# Patient Record
Sex: FEMALE | Race: BLACK OR AFRICAN AMERICAN | URBAN - METROPOLITAN AREA
[De-identification: names, ages, dates, MRNs, and addresses within clinical notes are randomized per-mention and may not be internally consistent; named-entity substitution may affect disease eponyms.]

---

## 2020-07-22 ENCOUNTER — LAB OUTSIDE AN ENCOUNTER (OUTPATIENT)
Dept: URBAN - METROPOLITAN AREA CLINIC 105 | Facility: CLINIC | Age: 55
End: 2020-07-22

## 2020-07-22 ENCOUNTER — OFFICE VISIT (OUTPATIENT)
Dept: URBAN - METROPOLITAN AREA CLINIC 105 | Facility: CLINIC | Age: 55
End: 2020-07-22
Payer: COMMERCIAL

## 2020-07-22 DIAGNOSIS — R11.2 NAUSEA & VOMITING: ICD-10-CM

## 2020-07-22 DIAGNOSIS — K62.5 RECTAL BLEEDING: ICD-10-CM

## 2020-07-22 DIAGNOSIS — F64.0 TRANSGENDER: ICD-10-CM

## 2020-07-22 DIAGNOSIS — R19.5 GUAIAC + STOOL: ICD-10-CM

## 2020-07-22 DIAGNOSIS — D50.0 NORMOCYTIC ANEMIA DUE TO BLOOD LOSS: ICD-10-CM

## 2020-07-22 PROCEDURE — 82746 ASSAY OF FOLIC ACID SERUM: CPT | Performed by: INTERNAL MEDICINE

## 2020-07-22 PROCEDURE — 82607 VITAMIN B-12: CPT | Performed by: INTERNAL MEDICINE

## 2020-07-22 PROCEDURE — G8427 DOCREV CUR MEDS BY ELIG CLIN: HCPCS | Performed by: INTERNAL MEDICINE

## 2020-07-22 PROCEDURE — 3017F COLORECTAL CA SCREEN DOC REV: CPT | Performed by: INTERNAL MEDICINE

## 2020-07-22 PROCEDURE — 99204 OFFICE O/P NEW MOD 45 MIN: CPT | Performed by: INTERNAL MEDICINE

## 2020-07-22 PROCEDURE — G9903 PT SCRN TBCO ID AS NON USER: HCPCS | Performed by: INTERNAL MEDICINE

## 2020-07-22 PROCEDURE — G8417 CALC BMI ABV UP PARAM F/U: HCPCS | Performed by: INTERNAL MEDICINE

## 2020-07-22 RX ORDER — OMEPRAZOLE 40 MG/1
1 CAPSULE 30 MINUTES BEFORE MORNING MEAL CAPSULE, DELAYED RELEASE ORAL TWICE A DAY
Status: ACTIVE | COMMUNITY

## 2020-07-22 RX ORDER — ATORVASTATIN CALCIUM 20 MG/1
1 TABLET TABLET, FILM COATED ORAL ONCE A DAY
Status: ACTIVE | COMMUNITY

## 2020-07-22 RX ORDER — CYANOCOBALAMIN (VITAMIN B-12) 500 MCG
1 TABLET TABLET ORAL ONCE A DAY
Status: ACTIVE | COMMUNITY

## 2020-07-22 RX ORDER — IBUPROFEN SODIUM 256 MG/1
1 TABLET WITH FOOD OR MILK AS NEEDED TABLET, COATED ORAL THREE TIMES A DAY
Status: ACTIVE | COMMUNITY

## 2020-07-22 NOTE — HPI-OTHER HISTORIES
The patient presents on hospital follow-up for anemia due to a GI bleed.  Today, she says she went to the ER ([facility]) last Monday due to dehydration after an acute episode of nausea/vomiting. She was kept overnight and given fluids and blood. She says she was given 2 pints of blood. She denies hematemesis. She reported darker stools with the acute episode of nausea/vomiting. She was put on omeprazole 40 mg BID for 14 days. Nausea/vomiting and abdominal pain resolved. She denies a history of vitamin B12 deficiency. She is not on oral iron. There have been no prior surgeries.  Labs 7/15/20 - CMP normal except potassium 3.3. 7/14/20 - CBC normal except WBC 12.7, hgb 9.7. BMP normal. 7/13/20 - CBC normal except WBC 11.5, hgb 7.7. BMP normal except BUN 31, alk phos 30, GFR 57, creatinine 1.23. Iron/TIBC normal. 7/12/20 - Tot chol 282, triglyceride 162, non-, , VLDL 32. Hgb A1c normal.

## 2020-07-23 LAB
BASO (ABSOLUTE): 0
BASOS: 0
EOS (ABSOLUTE): 0.3
EOS: 3
FERRITIN, SERUM: 112
FOLATE (FOLIC ACID), SERUM: 10.9
HEMATOCRIT: 24.7
HEMATOLOGY COMMENTS:: (no result)
HEMOGLOBIN: 7.7
HGB A2: 1.8
HGB A: 98.2
HGB C: 0
HGB F: 0
HGB S: 0
HGB SOLUBILITY: NEGATIVE
HGB VARIANT: (no result)
IMMATURE CELLS: (no result)
IMMATURE GRANS (ABS): 0
IMMATURE GRANULOCYTES: 1
INTERPRETATION: (no result)
IRON BIND.CAP.(TIBC): 324
IRON SATURATION: 9
IRON: 28
LYMPHS (ABSOLUTE): 3.1
LYMPHS: 39
MCH: 29.2
MCHC: 31.2
MCV: 94
MONOCYTES(ABSOLUTE): 0.5
MONOCYTES: 6
NEUTROPHILS (ABSOLUTE): 3.9
NEUTROPHILS: 51
NRBC: 1
PLATELETS: 488
RBC: 2.64
RDW: 14
UIBC: 296
VITAMIN B12: 525
WBC: 7.8

## 2020-07-24 ENCOUNTER — TELEPHONE ENCOUNTER (OUTPATIENT)
Dept: URBAN - METROPOLITAN AREA CLINIC 92 | Facility: CLINIC | Age: 55
End: 2020-07-24

## 2020-07-24 RX ORDER — FERROUS GLUCONATE 324 MG
1 TABLET WITH WATER OR JUICE BETWEEN MEALS TABLET ORAL ONCE A DAY
Qty: 90 TABLET | Refills: 1 | OUTPATIENT
Start: 2020-07-24

## 2020-09-01 ENCOUNTER — TELEPHONE ENCOUNTER (OUTPATIENT)
Dept: URBAN - METROPOLITAN AREA CLINIC 92 | Facility: CLINIC | Age: 55
End: 2020-09-01

## 2020-09-01 ENCOUNTER — OFFICE VISIT (OUTPATIENT)
Dept: URBAN - METROPOLITAN AREA MEDICAL CENTER 33 | Facility: MEDICAL CENTER | Age: 55
End: 2020-09-01
Payer: COMMERCIAL

## 2020-09-01 DIAGNOSIS — B96.81 BACTERIAL INFECTION DUE TO H. PYLORI: ICD-10-CM

## 2020-09-01 DIAGNOSIS — K29.60 ADENOPAPILLOMATOSIS GASTRICA: ICD-10-CM

## 2020-09-01 DIAGNOSIS — K62.5 ANAL BLEEDING: ICD-10-CM

## 2020-09-01 PROCEDURE — G9937 DIG OR SURV COLSCO: HCPCS | Performed by: INTERNAL MEDICINE

## 2020-09-01 PROCEDURE — 45378 DIAGNOSTIC COLONOSCOPY: CPT | Performed by: INTERNAL MEDICINE

## 2020-09-01 PROCEDURE — 43239 EGD BIOPSY SINGLE/MULTIPLE: CPT | Performed by: INTERNAL MEDICINE

## 2020-09-01 RX ORDER — FERROUS GLUCONATE 324 MG
1 TABLET WITH WATER OR JUICE BETWEEN MEALS TABLET ORAL ONCE A DAY
Qty: 90 TABLET | Refills: 1 | Status: ACTIVE | COMMUNITY
Start: 2020-07-24

## 2020-09-01 RX ORDER — IBUPROFEN SODIUM 256 MG/1
1 TABLET WITH FOOD OR MILK AS NEEDED TABLET, COATED ORAL THREE TIMES A DAY
Status: ACTIVE | COMMUNITY

## 2020-09-01 RX ORDER — OMEPRAZOLE 40 MG/1
1 CAPSULE 30 MINUTES BEFORE MORNING MEAL CAPSULE, DELAYED RELEASE ORAL ONCE A DAY
Qty: 90 | Refills: 1

## 2020-09-01 RX ORDER — CYANOCOBALAMIN (VITAMIN B-12) 500 MCG
1 TABLET TABLET ORAL ONCE A DAY
Status: ACTIVE | COMMUNITY

## 2020-09-01 RX ORDER — ATORVASTATIN CALCIUM 20 MG/1
1 TABLET TABLET, FILM COATED ORAL ONCE A DAY
Status: ACTIVE | COMMUNITY

## 2020-09-01 RX ORDER — OMEPRAZOLE 40 MG/1
1 CAPSULE 30 MINUTES BEFORE MORNING MEAL CAPSULE, DELAYED RELEASE ORAL TWICE A DAY
Status: ACTIVE | COMMUNITY

## 2020-10-27 ENCOUNTER — OFFICE VISIT (OUTPATIENT)
Dept: URBAN - METROPOLITAN AREA CLINIC 105 | Facility: CLINIC | Age: 55
End: 2020-10-27
Payer: COMMERCIAL

## 2020-10-27 DIAGNOSIS — K64.8 INTERNAL HEMORRHOID, BLEEDING: ICD-10-CM

## 2020-10-27 DIAGNOSIS — D50.9 IRON DEFICIENCY ANEMIA: ICD-10-CM

## 2020-10-27 DIAGNOSIS — A04.8 HELICOBACTER PYLORI (H. PYLORI): ICD-10-CM

## 2020-10-27 DIAGNOSIS — R11.2 NAUSEA & VOMITING: ICD-10-CM

## 2020-10-27 PROCEDURE — G8427 DOCREV CUR MEDS BY ELIG CLIN: HCPCS | Performed by: INTERNAL MEDICINE

## 2020-10-27 PROCEDURE — G9903 PT SCRN TBCO ID AS NON USER: HCPCS | Performed by: INTERNAL MEDICINE

## 2020-10-27 PROCEDURE — G8482 FLU IMMUNIZE ORDER/ADMIN: HCPCS | Performed by: INTERNAL MEDICINE

## 2020-10-27 PROCEDURE — 99214 OFFICE O/P EST MOD 30 MIN: CPT | Performed by: INTERNAL MEDICINE

## 2020-10-27 PROCEDURE — 3017F COLORECTAL CA SCREEN DOC REV: CPT | Performed by: INTERNAL MEDICINE

## 2020-10-27 PROCEDURE — G8417 CALC BMI ABV UP PARAM F/U: HCPCS | Performed by: INTERNAL MEDICINE

## 2020-10-27 PROCEDURE — 1036F TOBACCO NON-USER: CPT | Performed by: INTERNAL MEDICINE

## 2020-10-27 RX ORDER — IBUPROFEN SODIUM 256 MG/1
1 TABLET WITH FOOD OR MILK AS NEEDED TABLET, COATED ORAL THREE TIMES A DAY
Status: ACTIVE | COMMUNITY

## 2020-10-27 RX ORDER — ATORVASTATIN CALCIUM 20 MG/1
1 TABLET TABLET, FILM COATED ORAL ONCE A DAY
Status: ACTIVE | COMMUNITY

## 2020-10-27 RX ORDER — OMEPRAZOLE 40 MG/1
1 CAPSULE 30 MINUTES BEFORE MORNING MEAL CAPSULE, DELAYED RELEASE ORAL ONCE A DAY
Qty: 90 | Refills: 1 | Status: ACTIVE | COMMUNITY

## 2020-10-27 RX ORDER — CYANOCOBALAMIN (VITAMIN B-12) 500 MCG
1 TABLET TABLET ORAL ONCE A DAY
Status: ACTIVE | COMMUNITY

## 2020-10-27 RX ORDER — FERROUS GLUCONATE 324 MG
1 TABLET WITH WATER OR JUICE BETWEEN MEALS TABLET ORAL ONCE A DAY
Qty: 90 TABLET | Refills: 1 | Status: ACTIVE | COMMUNITY
Start: 2020-07-24

## 2020-10-27 NOTE — HPI-OTHER HISTORIES
The patient presents on hospital follow-up for anemia due to a GI bleed.  On 7/22/20, she said she went to the ER ([facility]) last Monday due to dehydration after an acute episode of nausea/vomiting. She was kept overnight and given fluids and blood. She said she was given 2 pints of blood. She denied hematemesis. She reported darker stools with the acute episode of nausea/vomiting. She was put on omeprazole 40 mg BID for 14 days. Nausea/vomiting and abdominal pain resolved. She denied a history of vitamin B12 deficiency. She was not on oral iron. There had been no prior surgeries.  Today, she says she didn't take omeprazole 40 mg BID with abx treatment for H. pylori. She says she was told to hold on omeprazole during that time - MA clearly documented instructed on taking PPI BID and with BID script sent to pharmacy. She is currently on omeprazole 40 mg QD. She is now on iron 1 pill QD. She notes increased energy since starting on iron. Nausea/vomiting has resolved. Rectal bleeding is associated with constipation. She takes a stool softener PRN.  Labs 7/22/20 - CBC normal except hgb 7.7, platelets 488. Iron sat 9%. Vit B12/folate, ferritin, hgb frac all normal. 7/15/20 - CMP normal except potassium 3.3. 7/14/20 - CBC normal except WBC 12.7, hgb 9.7. BMP normal. 7/13/20 - CBC normal except WBC 11.5, hgb 7.7. BMP normal except BUN 31, alk phos 30, GFR 57, creatinine 1.23. Iron/TIBC normal. 7/12/20 - Tot chol 282, triglyceride 162, non-, , VLDL 32. Hgb A1c normal.

## 2020-10-29 LAB
BASO (ABSOLUTE): 0.1
BASOS: 1
EOS (ABSOLUTE): 0.2
EOS: 2
FERRITIN, SERUM: 145
H PYLORI BREATH TEST: NEGATIVE
HEMATOCRIT: 34.9
HEMATOLOGY COMMENTS:: (no result)
HEMOGLOBIN: 11.3
IMMATURE CELLS: (no result)
IMMATURE GRANS (ABS): 0
IMMATURE GRANULOCYTES: 0
IRON BIND.CAP.(TIBC): 356
IRON SATURATION: 21
IRON: 74
LYMPHS (ABSOLUTE): 2.8
LYMPHS: 40
MCH: 28.5
MCHC: 32.4
MCV: 88
MONOCYTES(ABSOLUTE): 0.5
MONOCYTES: 7
NEUTROPHILS (ABSOLUTE): 3.4
NEUTROPHILS: 50
NRBC: (no result)
PLATELETS: 360
RBC: 3.97
RDW: 13.9
UIBC: 282
WBC: 6.9

## 2021-01-05 ENCOUNTER — OFFICE VISIT (OUTPATIENT)
Dept: URBAN - METROPOLITAN AREA CLINIC 105 | Facility: CLINIC | Age: 56
End: 2021-01-05
Payer: COMMERCIAL

## 2021-01-05 VITALS
BODY MASS INDEX: 33.73 KG/M2 | TEMPERATURE: 97 F | DIASTOLIC BLOOD PRESSURE: 83 MMHG | SYSTOLIC BLOOD PRESSURE: 123 MMHG | HEART RATE: 70 BPM | HEIGHT: 70 IN | WEIGHT: 235.6 LBS

## 2021-01-05 DIAGNOSIS — E53.8 VITAMIN B12 DEFICIENCY: ICD-10-CM

## 2021-01-05 DIAGNOSIS — K64.8 INTERNAL HEMORRHOID, BLEEDING: ICD-10-CM

## 2021-01-05 DIAGNOSIS — D50.9 IRON DEFICIENCY ANEMIA: ICD-10-CM

## 2021-01-05 DIAGNOSIS — Z12.11 SCREEN FOR COLON CANCER: ICD-10-CM

## 2021-01-05 DIAGNOSIS — E55.9 VITAMIN D DEFICIENCY: ICD-10-CM

## 2021-01-05 DIAGNOSIS — A04.8 HELICOBACTER PYLORI (H. PYLORI): ICD-10-CM

## 2021-01-05 DIAGNOSIS — R11.2 NAUSEA & VOMITING: ICD-10-CM

## 2021-01-05 DIAGNOSIS — F64.0 TRANSGENDER: ICD-10-CM

## 2021-01-05 PROCEDURE — G9903 PT SCRN TBCO ID AS NON USER: HCPCS | Performed by: INTERNAL MEDICINE

## 2021-01-05 PROCEDURE — 3017F COLORECTAL CA SCREEN DOC REV: CPT | Performed by: INTERNAL MEDICINE

## 2021-01-05 PROCEDURE — G8417 CALC BMI ABV UP PARAM F/U: HCPCS | Performed by: INTERNAL MEDICINE

## 2021-01-05 PROCEDURE — G8482 FLU IMMUNIZE ORDER/ADMIN: HCPCS | Performed by: INTERNAL MEDICINE

## 2021-01-05 PROCEDURE — G8427 DOCREV CUR MEDS BY ELIG CLIN: HCPCS | Performed by: INTERNAL MEDICINE

## 2021-01-05 PROCEDURE — 99214 OFFICE O/P EST MOD 30 MIN: CPT | Performed by: INTERNAL MEDICINE

## 2021-01-05 RX ORDER — OMEPRAZOLE 40 MG/1
1 CAPSULE 30 MINUTES BEFORE MORNING MEAL CAPSULE, DELAYED RELEASE ORAL ONCE A DAY
Qty: 90 | Refills: 1 | Status: ACTIVE | COMMUNITY

## 2021-01-05 RX ORDER — CYANOCOBALAMIN (VITAMIN B-12) 500 MCG
1 TABLET TABLET ORAL ONCE A DAY
Status: ACTIVE | COMMUNITY

## 2021-01-05 RX ORDER — FERROUS GLUCONATE 324 MG
1 TABLET WITH WATER OR JUICE BETWEEN MEALS TABLET ORAL ONCE A DAY
Qty: 90 TABLET | Refills: 1 | Status: ACTIVE | COMMUNITY
Start: 2020-07-24

## 2021-01-05 RX ORDER — ATORVASTATIN CALCIUM 20 MG/1
1 TABLET TABLET, FILM COATED ORAL ONCE A DAY
Status: ACTIVE | COMMUNITY

## 2021-01-05 RX ORDER — IBUPROFEN SODIUM 256 MG/1
1 TABLET WITH FOOD OR MILK AS NEEDED TABLET, COATED ORAL THREE TIMES A DAY
Status: ACTIVE | COMMUNITY

## 2021-01-05 NOTE — HPI-OTHER HISTORIES
The patient presents on hospital follow-up for anemia due to a GI bleed.  On 7/22/20, she said she went to the ER ([facility]) last Monday due to dehydration after an acute episode of nausea/vomiting. She was kept overnight and given fluids and blood. She said she was given 2 pints of blood. She denied hematemesis. She reported darker stools with the acute episode of nausea/vomiting. She was put on omeprazole 40 mg BID for 14 days. Nausea/vomiting and abdominal pain resolved. She denied a history of vitamin B12 deficiency. She was not on oral iron. There had been no prior surgeries.  On 10/27/20, she said she didn't take omeprazole 40 mg BID with abx treatment for H. pylori. She said she was told to hold on omeprazole during that time - MA clearly documented instructed on taking PPI BID and with BID script sent to pharmacy. She was currently on omeprazole 40 mg QD. She was now on iron 1 pill QD. She noted increased energy since starting on iron. Nausea/vomiting has resolved. Rectal bleeding was associated with constipation. She took a stool softener PRN.  Today, she says she is off iron. She continues on vitamin B12/D/C and omeprazole 40 mg QAM. There hasn't been a recurrence of nausea/vomiting she states. She feels energized.  Labs 10/27/20 - H. pylori breath test negative. CBC, CMP, iron/TIBC, ferritin all normal. 7/22/20 - CBC normal except hgb 7.7, platelets 488. Iron sat 9%. Vit B12/folate, ferritin, hgb frac all normal. 7/15/20 - CMP normal except potassium 3.3. 7/14/20 - CBC normal except WBC 12.7, hgb 9.7. BMP normal. 7/13/20 - CBC normal except WBC 11.5, hgb 7.7. BMP normal except BUN 31, alk phos 30, GFR 57, creatinine 1.23. Iron/TIBC normal. 7/12/20 - Tot chol 282, triglyceride 162, non-, , VLDL 32. Hgb A1c normal.

## 2021-01-06 LAB
BASO (ABSOLUTE): 0
BASOS: 1
EOS (ABSOLUTE): 0.3
EOS: 4
FERRITIN, SERUM: 100
HEMATOCRIT: 37.3
HEMATOLOGY COMMENTS:: (no result)
HEMOGLOBIN: 11.9
IMMATURE CELLS: (no result)
IMMATURE GRANS (ABS): 0
IMMATURE GRANULOCYTES: 0
IRON BIND.CAP.(TIBC): 355
IRON SATURATION: 15
IRON: 53
LYMPHS (ABSOLUTE): 3
LYMPHS: 46
MCH: 29.1
MCHC: 31.9
MCV: 91
MONOCYTES(ABSOLUTE): 0.4
MONOCYTES: 6
NEUTROPHILS (ABSOLUTE): 2.8
NEUTROPHILS: 43
NRBC: (no result)
PLATELETS: 392
RBC: 4.09
RDW: 11.9
UIBC: 302
VITAMIN B12: 1178
VITAMIN D, 25-HYDROXY: 33.3
WBC: 6.5

## 2021-04-06 ENCOUNTER — WEB ENCOUNTER (OUTPATIENT)
Dept: URBAN - METROPOLITAN AREA CLINIC 105 | Facility: CLINIC | Age: 56
End: 2021-04-06

## 2021-04-06 ENCOUNTER — OFFICE VISIT (OUTPATIENT)
Dept: URBAN - METROPOLITAN AREA CLINIC 105 | Facility: CLINIC | Age: 56
End: 2021-04-06
Payer: COMMERCIAL

## 2021-04-06 DIAGNOSIS — Z12.11 SCREEN FOR COLON CANCER: ICD-10-CM

## 2021-04-06 DIAGNOSIS — E53.8 VITAMIN B12 DEFICIENCY: ICD-10-CM

## 2021-04-06 DIAGNOSIS — K64.8 INTERNAL HEMORRHOID, BLEEDING: ICD-10-CM

## 2021-04-06 DIAGNOSIS — R11.2 NAUSEA & VOMITING: ICD-10-CM

## 2021-04-06 DIAGNOSIS — A04.8 HELICOBACTER PYLORI (H. PYLORI): ICD-10-CM

## 2021-04-06 DIAGNOSIS — E55.9 VITAMIN D DEFICIENCY: ICD-10-CM

## 2021-04-06 DIAGNOSIS — D50.9 IRON DEFICIENCY ANEMIA: ICD-10-CM

## 2021-04-06 DIAGNOSIS — F64.0 TRANSGENDER: ICD-10-CM

## 2021-04-06 PROCEDURE — 99214 OFFICE O/P EST MOD 30 MIN: CPT | Performed by: INTERNAL MEDICINE

## 2021-04-06 RX ORDER — IBUPROFEN SODIUM 256 MG/1
1 TABLET WITH FOOD OR MILK AS NEEDED TABLET, COATED ORAL THREE TIMES A DAY
Status: ACTIVE | COMMUNITY

## 2021-04-06 RX ORDER — ATORVASTATIN CALCIUM 20 MG/1
1 TABLET TABLET, FILM COATED ORAL ONCE A DAY
Status: ACTIVE | COMMUNITY

## 2021-04-06 RX ORDER — CYANOCOBALAMIN (VITAMIN B-12) 500 MCG
1 TABLET TABLET ORAL ONCE A DAY
Status: ACTIVE | COMMUNITY

## 2021-04-06 RX ORDER — OMEPRAZOLE 40 MG/1
1 CAPSULE 30 MINUTES BEFORE MORNING MEAL CAPSULE, DELAYED RELEASE ORAL ONCE A DAY
Qty: 90 | Refills: 1 | Status: ACTIVE | COMMUNITY

## 2021-04-06 RX ORDER — FERROUS GLUCONATE 324 MG
1 TABLET WITH WATER OR JUICE BETWEEN MEALS TABLET ORAL ONCE A DAY
Qty: 90 TABLET | Refills: 1 | Status: ACTIVE | COMMUNITY
Start: 2020-07-24

## 2021-04-06 NOTE — HPI-OTHER HISTORIES
The patient presents on hospital follow-up for anemia due to a GI bleed.  On 7/22/20, she said she went to the ER ([facility]) last Monday due to dehydration after an acute episode of nausea/vomiting. She was kept overnight and given fluids and blood. She said she was given 2 pints of blood. She denied hematemesis. She reported darker stools with the acute episode of nausea/vomiting. She was put on omeprazole 40 mg BID for 14 days. Nausea/vomiting and abdominal pain resolved. She denied a history of vitamin B12 deficiency. She was not on oral iron. There had been no prior surgeries.  On 10/27/20, she said she didn't take omeprazole 40 mg BID with abx treatment for H. pylori. She said she was told to hold on omeprazole during that time - MA clearly documented instructed on taking PPI BID and with BID script sent to pharmacy. She was currently on omeprazole 40 mg QD. She was now on iron 1 pill QD. She noted increased energy since starting on iron. Nausea/vomiting has resolved. Rectal bleeding was associated with constipation. She took a stool softener PRN.  On 1/5/21, she said she was off iron. She continued on vitamin B12/D/C and omeprazole 40 mg QAM. There hadn't been a recurrence of nausea/vomiting she stated. She felt energized.  Today, she says she tapered off omeprazole in March. She continues on vit D/B12/C.  She denies any nausea/vomiting.  Labs 1/5/21 - CBC, iron/TIBC, ferritin, vit B12 all normal. 10/27/20 - H. pylori breath test negative. CBC, CMP, iron/TIBC, ferritin all normal. 7/22/20 - CBC normal except hgb 7.7, platelets 488. Iron sat 9%. Vit B12/folate, ferritin, hgb frac all normal. 7/15/20 - CMP normal except potassium 3.3. 7/14/20 - CBC normal except WBC 12.7, hgb 9.7. BMP normal. 7/13/20 - CBC normal except WBC 11.5, hgb 7.7. BMP normal except BUN 31, alk phos 30, GFR 57, creatinine 1.23. Iron/TIBC normal. 7/12/20 - Tot chol 282, triglyceride 162, non-, , VLDL 32. Hgb A1c normal.

## 2021-07-20 ENCOUNTER — TELEPHONE ENCOUNTER (OUTPATIENT)
Dept: URBAN - METROPOLITAN AREA CLINIC 105 | Facility: CLINIC | Age: 56
End: 2021-07-20

## 2021-07-20 RX ORDER — OMEPRAZOLE 40 MG/1
1 CAPSULE 30 MINUTES BEFORE MORNING MEAL CAPSULE, DELAYED RELEASE ORAL ONCE A DAY
Qty: 90 | Refills: 3

## 2021-07-20 RX ORDER — OMEPRAZOLE 40 MG/1
1 CAPSULE 30 MINUTES BEFORE MORNING MEAL CAPSULE, DELAYED RELEASE ORAL ONCE A DAY
Qty: 90 | Refills: 1

## 2021-10-06 ENCOUNTER — OFFICE VISIT (OUTPATIENT)
Dept: URBAN - METROPOLITAN AREA CLINIC 105 | Facility: CLINIC | Age: 56
End: 2021-10-06
Payer: COMMERCIAL

## 2021-10-06 DIAGNOSIS — K64.8 INTERNAL HEMORRHOID, BLEEDING: ICD-10-CM

## 2021-10-06 DIAGNOSIS — R11.2 NAUSEA & VOMITING: ICD-10-CM

## 2021-10-06 DIAGNOSIS — E55.9 VITAMIN D DEFICIENCY: ICD-10-CM

## 2021-10-06 DIAGNOSIS — A04.8 HELICOBACTER PYLORI (H. PYLORI): ICD-10-CM

## 2021-10-06 DIAGNOSIS — E53.8 VITAMIN B12 DEFICIENCY: ICD-10-CM

## 2021-10-06 DIAGNOSIS — Z12.11 SCREEN FOR COLON CANCER: ICD-10-CM

## 2021-10-06 DIAGNOSIS — D50.9 IRON DEFICIENCY ANEMIA: ICD-10-CM

## 2021-10-06 DIAGNOSIS — F64.0 TRANSGENDER: ICD-10-CM

## 2021-10-06 PROCEDURE — 99214 OFFICE O/P EST MOD 30 MIN: CPT | Performed by: INTERNAL MEDICINE

## 2021-10-06 RX ORDER — IBUPROFEN SODIUM 256 MG/1
1 TABLET WITH FOOD OR MILK AS NEEDED TABLET, COATED ORAL THREE TIMES A DAY
Status: ON HOLD | COMMUNITY

## 2021-10-06 RX ORDER — CYANOCOBALAMIN (VITAMIN B-12) 500 MCG
1 TABLET TABLET ORAL ONCE A DAY
Status: ACTIVE | COMMUNITY

## 2021-10-06 RX ORDER — ATORVASTATIN CALCIUM 20 MG/1
1 TABLET TABLET, FILM COATED ORAL ONCE A DAY
Status: ACTIVE | COMMUNITY

## 2021-10-06 RX ORDER — FERROUS GLUCONATE 324 MG
1 TABLET WITH WATER OR JUICE BETWEEN MEALS TABLET ORAL ONCE A DAY
Qty: 90 TABLET | Refills: 1 | Status: ACTIVE | COMMUNITY
Start: 2020-07-24

## 2021-10-06 RX ORDER — OMEPRAZOLE 40 MG/1
1 CAPSULE 30 MINUTES BEFORE MORNING MEAL CAPSULE, DELAYED RELEASE ORAL ONCE A DAY
Qty: 90 | Refills: 1 | Status: ACTIVE | COMMUNITY

## 2021-10-06 NOTE — HPI-OTHER HISTORIES
The patient presents on hospital follow-up for anemia due to a GI bleed.  On 7/22/20, she said she went to the ER ([facility]) last Monday due to dehydration after an acute episode of nausea/vomiting. She was kept overnight and given fluids and blood. She said she was given 2 pints of blood. She denied hematemesis. She reported darker stools with the acute episode of nausea/vomiting. She was put on omeprazole 40 mg BID for 14 days. Nausea/vomiting and abdominal pain resolved. She denied a history of vitamin B12 deficiency. She was not on oral iron. There had been no prior surgeries.  On 10/27/20, she said she didn't take omeprazole 40 mg BID with abx treatment for H. pylori. She said she was told to hold on omeprazole during that time - MA clearly documented instructed on taking PPI BID and with BID script sent to pharmacy. She was currently on omeprazole 40 mg QD. She was now on iron 1 pill QD. She noted increased energy since starting on iron. Nausea/vomiting has resolved. Rectal bleeding was associated with constipation. She took a stool softener PRN.  On 1/5/21, she said she was off iron. She continued on vitamin B12/D/C and omeprazole 40 mg QAM. There hadn't been a recurrence of nausea/vomiting she stated. She felt energized.  On 4/6/21, she said she tapered off omeprazole in March. She continued on vit D/B12/C.  She denied any nausea/vomiting.  Today, she says she did not get around to getting her labs done with [facility]. She had her annual physical at the beginning of the Summer. She has not had a recurrence of nausea/vomiting. She takes omeprazole PRN.  Labs 1/5/21 - CBC, iron/TIBC, ferritin, vit B12 all normal. 10/27/20 - H. pylori breath test negative. CBC, CMP, iron/TIBC, ferritin all normal. 7/22/20 - CBC normal except hgb 7.7, platelets 488. Iron sat 9%. Vit B12/folate, ferritin, hgb frac all normal. 7/15/20 - CMP normal except potassium 3.3. 7/14/20 - CBC normal except WBC 12.7, hgb 9.7. BMP normal. 7/13/20 - CBC normal except WBC 11.5, hgb 7.7. BMP normal except BUN 31, alk phos 30, GFR 57, creatinine 1.23. Iron/TIBC normal. 7/12/20 - Tot chol 282, triglyceride 162, non-, , VLDL 32. Hgb A1c normal.

## 2021-10-08 LAB
BASO (ABSOLUTE): 0
BASOS: 1
EOS (ABSOLUTE): 0.2
EOS: 3
FERRITIN, SERUM: 228
H PYLORI BREATH TEST: NEGATIVE
HEMATOCRIT: 40.3
HEMATOLOGY COMMENTS:: (no result)
HEMOGLOBIN: 13
IMMATURE CELLS: (no result)
IMMATURE GRANS (ABS): 0
IMMATURE GRANULOCYTES: 0
IRON BIND.CAP.(TIBC): 356
IRON SATURATION: 24
IRON: 84
LYMPHS (ABSOLUTE): 4.5
LYMPHS: 57
MCH: 29.3
MCHC: 32.3
MCV: 91
MONOCYTES(ABSOLUTE): 0.5
MONOCYTES: 6
NEUTROPHILS (ABSOLUTE): 2.6
NEUTROPHILS: 33
NRBC: (no result)
PLATELETS: 369
RBC: 4.44
RDW: 11.7
UIBC: 272
VITAMIN B12: 1130
VITAMIN D, 25-HYDROXY: 66
WBC: 7.9

## 2022-04-06 ENCOUNTER — OFFICE VISIT (OUTPATIENT)
Dept: URBAN - METROPOLITAN AREA CLINIC 105 | Facility: CLINIC | Age: 57
End: 2022-04-06
Payer: COMMERCIAL

## 2022-04-06 VITALS
BODY MASS INDEX: 34.13 KG/M2 | HEART RATE: 91 BPM | DIASTOLIC BLOOD PRESSURE: 84 MMHG | TEMPERATURE: 97 F | HEIGHT: 70 IN | WEIGHT: 238.4 LBS | SYSTOLIC BLOOD PRESSURE: 137 MMHG

## 2022-04-06 DIAGNOSIS — E55.9 VITAMIN D DEFICIENCY: ICD-10-CM

## 2022-04-06 DIAGNOSIS — K64.8 INTERNAL HEMORRHOID, BLEEDING: ICD-10-CM

## 2022-04-06 DIAGNOSIS — R11.2 NAUSEA & VOMITING: ICD-10-CM

## 2022-04-06 DIAGNOSIS — E53.8 VITAMIN B12 DEFICIENCY: ICD-10-CM

## 2022-04-06 DIAGNOSIS — Z12.11 SCREEN FOR COLON CANCER: ICD-10-CM

## 2022-04-06 DIAGNOSIS — F64.0 TRANSGENDER: ICD-10-CM

## 2022-04-06 DIAGNOSIS — A04.8 HELICOBACTER PYLORI (H. PYLORI): ICD-10-CM

## 2022-04-06 DIAGNOSIS — D50.9 IRON DEFICIENCY ANEMIA: ICD-10-CM

## 2022-04-06 PROCEDURE — 99214 OFFICE O/P EST MOD 30 MIN: CPT | Performed by: INTERNAL MEDICINE

## 2022-04-06 RX ORDER — FERROUS GLUCONATE 324 MG
1 TABLET WITH WATER OR JUICE BETWEEN MEALS TABLET ORAL ONCE A DAY
Qty: 90 TABLET | Refills: 1 | Status: ACTIVE | COMMUNITY
Start: 2020-07-24

## 2022-04-06 RX ORDER — CYANOCOBALAMIN (VITAMIN B-12) 500 MCG
1 TABLET TABLET ORAL ONCE A DAY
Status: ACTIVE | COMMUNITY

## 2022-04-06 RX ORDER — OMEPRAZOLE 40 MG/1
1 CAPSULE 30 MINUTES BEFORE MORNING MEAL CAPSULE, DELAYED RELEASE ORAL ONCE A DAY
Qty: 90 | Refills: 1 | Status: ACTIVE | COMMUNITY

## 2022-04-06 RX ORDER — ATORVASTATIN CALCIUM 20 MG/1
1 TABLET TABLET, FILM COATED ORAL ONCE A DAY
Status: ACTIVE | COMMUNITY

## 2022-04-06 RX ORDER — ATORVASTATIN CALCIUM 10 MG/1
1 TABLET TABLET, FILM COATED ORAL ONCE A DAY
Status: ACTIVE | COMMUNITY

## 2022-04-06 NOTE — HPI-OTHER HISTORIES
The patient presents on hospital follow-up for anemia due to a GI bleed.  PAST MEDICAL HISTORY: On 7/22/20, she said she went to the ER ([facility]) last Monday due to dehydration after an acute episode of nausea/vomiting. She was kept overnight and given fluids and blood. She said she was given 2 pints of blood. She denied hematemesis. She reported darker stools with the acute episode of nausea/vomiting. She was put on omeprazole 40 mg BID for 14 days. Nausea/vomiting and abdominal pain resolved. She denied a history of vitamin B12 deficiency. She was not on oral iron. There had been no prior surgeries.  On 10/27/20, she said she didn't take omeprazole 40 mg BID with abx treatment for H. pylori. She said she was told to hold on omeprazole during that time - MA clearly documented instructed on taking PPI BID and with BID script sent to pharmacy. She was currently on omeprazole 40 mg QD. She was now on iron 1 pill QD. She noted increased energy since starting on iron. Nausea/vomiting has resolved. Rectal bleeding was associated with constipation. She took a stool softener PRN.  On 1/5/21, she said she was off iron. She continued on vitamin B12/D/C and omeprazole 40 mg QAM. There hadn't been a recurrence of nausea/vomiting she stated. She felt energized.  On 4/6/21, she said she tapered off omeprazole in March. She continued on vit D/B12/C.  She denied any nausea/vomiting.  On 10/6/21, she said she did not get around to getting her labs done with [facility]. She had her annual physical at the beginning of the Summer. She had not had a recurrence of nausea/vomiting. She took omeprazole PRN.  HPI: Today, she says she is asymptomatic. She continues on vit D & B12 supplementation. She is off iron.  Labs 10/7/21 - Ferritin 228, TIBC 356, UIBC 272, iron 84, iron sat 24%. H. pylori breath test negative. CBC, vit B12, vit D all normal. 1/5/21 - CBC, iron/TIBC, ferritin, vit B12 all normal. 10/27/20 - H. pylori breath test negative. CBC, CMP, iron/TIBC, ferritin all normal. 7/22/20 - CBC normal except hgb 7.7, platelets 488. Iron sat 9%. Vit B12/folate, ferritin, hgb frac all normal. 7/15/20 - CMP normal except potassium 3.3. 7/14/20 - CBC normal except WBC 12.7, hgb 9.7. BMP normal. 7/13/20 - CBC normal except WBC 11.5, hgb 7.7. BMP normal except BUN 31, alk phos 30, GFR 57, creatinine 1.23. Iron/TIBC normal. 7/12/20 - Tot chol 282, triglyceride 162, non-, , VLDL 32. Hgb A1c normal.

## 2022-04-07 LAB
BASO (ABSOLUTE): 0.1
BASOS: 1
EOS (ABSOLUTE): 0.5
EOS: 8
FERRITIN, SERUM: 177
HEMATOCRIT: 37.3
HEMATOLOGY COMMENTS:: (no result)
HEMOGLOBIN: 12.3
IMMATURE CELLS: (no result)
IMMATURE GRANS (ABS): 0
IMMATURE GRANULOCYTES: 0
IRON BIND.CAP.(TIBC): 327
IRON SATURATION: 22
IRON: 73
LYMPHS (ABSOLUTE): 3.5
LYMPHS: 56
MCH: 29.7
MCHC: 33
MCV: 90
MONOCYTES(ABSOLUTE): 0.4
MONOCYTES: 7
NEUTROPHILS (ABSOLUTE): 1.7
NEUTROPHILS: 28
NRBC: (no result)
PLATELETS: 363
RBC: 4.14
RDW: 11.9
UIBC: 254
VITAMIN B12: 691
VITAMIN D, 25-HYDROXY: 56.5
WBC: 6.2

## 2022-04-09 PROBLEM — 34713006 VITAMIN D DEFICIENCY: Status: ACTIVE | Noted: 2021-01-05

## 2022-04-09 PROBLEM — 12271241000119109 TRANSGENDER: Status: ACTIVE | Noted: 2021-04-06

## 2023-04-25 ENCOUNTER — OFFICE VISIT (OUTPATIENT)
Dept: URBAN - METROPOLITAN AREA CLINIC 105 | Facility: CLINIC | Age: 58
End: 2023-04-25

## 2023-04-25 VITALS — HEIGHT: 70 IN

## 2023-04-25 RX ORDER — ATORVASTATIN CALCIUM 10 MG/1
1 TABLET TABLET, FILM COATED ORAL ONCE A DAY
Status: ACTIVE | COMMUNITY

## 2023-04-25 RX ORDER — ATORVASTATIN CALCIUM 20 MG/1
1 TABLET TABLET, FILM COATED ORAL ONCE A DAY
Status: ACTIVE | COMMUNITY

## 2023-04-25 RX ORDER — OMEPRAZOLE 40 MG/1
1 CAPSULE 30 MINUTES BEFORE MORNING MEAL CAPSULE, DELAYED RELEASE ORAL ONCE A DAY
Qty: 90 | Refills: 1 | Status: ACTIVE | COMMUNITY

## 2023-04-25 RX ORDER — FERROUS GLUCONATE 324 MG
1 TABLET WITH WATER OR JUICE BETWEEN MEALS TABLET ORAL ONCE A DAY
Qty: 90 TABLET | Refills: 1 | Status: ACTIVE | COMMUNITY
Start: 2020-07-24

## 2023-04-25 RX ORDER — CYANOCOBALAMIN (VITAMIN B-12) 500 MCG
1 TABLET TABLET ORAL ONCE A DAY
Status: ACTIVE | COMMUNITY

## 2023-06-18 ENCOUNTER — WEB ENCOUNTER (OUTPATIENT)
Dept: URBAN - METROPOLITAN AREA CLINIC 105 | Facility: CLINIC | Age: 58
End: 2023-06-18

## 2023-06-21 ENCOUNTER — OFFICE VISIT (OUTPATIENT)
Dept: URBAN - METROPOLITAN AREA CLINIC 105 | Facility: CLINIC | Age: 58
End: 2023-06-21

## 2023-06-21 RX ORDER — ATORVASTATIN CALCIUM 10 MG/1
1 TABLET TABLET, FILM COATED ORAL ONCE A DAY
COMMUNITY

## 2023-06-21 RX ORDER — OMEPRAZOLE 40 MG/1
1 CAPSULE 30 MINUTES BEFORE MORNING MEAL CAPSULE, DELAYED RELEASE ORAL ONCE A DAY
Qty: 90 | Refills: 1 | COMMUNITY

## 2023-06-21 RX ORDER — FERROUS GLUCONATE 324 MG
1 TABLET WITH WATER OR JUICE BETWEEN MEALS TABLET ORAL ONCE A DAY
Qty: 90 TABLET | Refills: 1 | COMMUNITY
Start: 2020-07-24

## 2023-06-21 RX ORDER — ATORVASTATIN CALCIUM 20 MG/1
1 TABLET TABLET, FILM COATED ORAL ONCE A DAY
COMMUNITY

## 2023-06-21 RX ORDER — CYANOCOBALAMIN (VITAMIN B-12) 500 MCG
1 TABLET TABLET ORAL ONCE A DAY
COMMUNITY

## 2023-10-31 ENCOUNTER — TELEPHONE ENCOUNTER (OUTPATIENT)
Dept: URBAN - METROPOLITAN AREA CLINIC 50 | Facility: CLINIC | Age: 58
End: 2023-10-31

## 2023-11-07 ENCOUNTER — TELEPHONE ENCOUNTER (OUTPATIENT)
Dept: URBAN - METROPOLITAN AREA CLINIC 105 | Facility: CLINIC | Age: 58
End: 2023-11-07

## 2023-11-09 ENCOUNTER — OFFICE VISIT (OUTPATIENT)
Dept: URBAN - METROPOLITAN AREA CLINIC 105 | Facility: CLINIC | Age: 58
End: 2023-11-09
Payer: COMMERCIAL

## 2023-11-09 VITALS
BODY MASS INDEX: 34.79 KG/M2 | HEIGHT: 70 IN | TEMPERATURE: 97.2 F | HEART RATE: 91 BPM | SYSTOLIC BLOOD PRESSURE: 149 MMHG | DIASTOLIC BLOOD PRESSURE: 95 MMHG | WEIGHT: 243 LBS

## 2023-11-09 DIAGNOSIS — D50.9 ANEMIA: ICD-10-CM

## 2023-11-09 PROBLEM — 87522002: Status: ACTIVE | Noted: 2023-11-09

## 2023-11-09 PROCEDURE — 99213 OFFICE O/P EST LOW 20 MIN: CPT | Performed by: INTERNAL MEDICINE

## 2023-11-09 RX ORDER — OMEPRAZOLE 40 MG/1
1 CAPSULE 30 MINUTES BEFORE MORNING MEAL CAPSULE, DELAYED RELEASE ORAL ONCE A DAY
Qty: 90 | Refills: 1 | Status: ACTIVE | COMMUNITY

## 2023-11-09 RX ORDER — FERROUS GLUCONATE 324 MG
1 TABLET WITH WATER OR JUICE BETWEEN MEALS TABLET ORAL ONCE A DAY
Qty: 90 TABLET | Refills: 1 | Status: ON HOLD | COMMUNITY
Start: 2020-07-24

## 2023-11-09 RX ORDER — ATORVASTATIN CALCIUM 10 MG/1
1 TABLET TABLET, FILM COATED ORAL ONCE A DAY
Status: ACTIVE | COMMUNITY

## 2023-11-09 RX ORDER — FERROUS GLUCONATE 324 MG
1 TABLET WITH WATER OR JUICE BETWEEN MEALS TABLET ORAL ONCE A DAY
Qty: 90 TABLET | Refills: 0 | OUTPATIENT
Start: 2020-07-24

## 2023-11-09 RX ORDER — ATORVASTATIN CALCIUM 20 MG/1
1 TABLET TABLET, FILM COATED ORAL ONCE A DAY
Status: ACTIVE | COMMUNITY

## 2023-11-09 RX ORDER — AMLODIPINE BESYLATE 10 MG/1
TAKE ONE-HALF TABLET BY MOUTH ONCE DAILY TABLET ORAL
Qty: 15 EACH | Refills: 1 | Status: ACTIVE | COMMUNITY

## 2023-11-09 RX ORDER — CYANOCOBALAMIN (VITAMIN B-12) 500 MCG
1 TABLET TABLET ORAL ONCE A DAY
Status: ACTIVE | COMMUNITY

## 2023-11-09 NOTE — HPI-OTHER HISTORIES
The patient presents on hospital follow-up for anemia due to a GI bleed.  PAST MEDICAL HISTORY: On 7/22/20, she said she went to the ER ([facility]) last Monday due to dehydration after an acute episode of nausea/vomiting. She was kept overnight and given fluids and blood. She said she was given 2 pints of blood. She denied hematemesis. She reported darker stools with the acute episode of nausea/vomiting. She was put on omeprazole 40 mg BID for 14 days. Nausea/vomiting and abdominal pain resolved. She denied a history of vitamin B12 deficiency. She was not on oral iron. There had been no prior surgeries.  On 10/27/20, she said she didn't take omeprazole 40 mg BID with abx treatment for H. pylori. She said she was told to hold on omeprazole during that time - MA clearly documented instructed on taking PPI BID and with BID script sent to pharmacy. She was currently on omeprazole 40 mg QD. She was now on iron 1 pill QD. She noted increased energy since starting on iron. Nausea/vomiting has resolved. Rectal bleeding was associated with constipation. She took a stool softener PRN.  On 1/5/21, she said she was off iron. She continued on vitamin B12/D/C and omeprazole 40 mg QAM. There hadn't been a recurrence of nausea/vomiting she stated. She felt energized.  On 4/6/21, she said she tapered off omeprazole in March. She continued on vit D/B12/C.  She denied any nausea/vomiting.  On 10/6/21, she said she did not get around to getting her labs done with [facility]. She had her annual physical at the beginning of the Summer. She had not had a recurrence of nausea/vomiting. She took omeprazole PRN.   On 04/06/2022, she says she is asymptomatic. She continues on vit D & B12 supplementation. She is off iron.  Labs 10/7/21 - Ferritin 228, TIBC 356, UIBC 272, iron 84, iron sat 24%. H. pylori breath test negative. CBC, vit B12, vit D all normal. 1/5/21 - CBC, iron/TIBC, ferritin, vit B12 all normal. 10/27/20 - H. pylori breath test negative. CBC, CMP, iron/TIBC, ferritin all normal. 7/22/20 - CBC normal except hgb 7.7, platelets 488. Iron sat 9%. Vit B12/folate, ferritin, hgb frac all normal. 7/15/20 - CMP normal except potassium 3.3. 7/14/20 - CBC normal except WBC 12.7, hgb 9.7. BMP normal. 7/13/20 - CBC normal except WBC 11.5, hgb 7.7. BMP normal except BUN 31, alk phos 30, GFR 57, creatinine 1.23. Iron/TIBC normal. 7/12/20 - Tot chol 282, triglyceride 162, non-, , VLDL 32. Hgb A1c normal.

## 2023-11-09 NOTE — HPI-TODAY'S VISIT:
2023 Patient went PCP at [facility] for labwork on 10/27/2023. States her hgb 11.3 Patient states her iron was not checked.I reviewed the labs myself and did not see iron studies.  Denies weakness, fatigue, SOB and heart palpitations, and melena. She is afraid of ended back in the hospital for her ZORAIDA. Patient has no additional complaints. She states the iron she had on hand  from Dr. Kay is . She has been taking OTC iron supplements 65 Fe since 10/27/2023.

## 2023-11-10 LAB
FERRITIN, SERUM: 139
IRON BIND.CAP.(TIBC): 321
IRON SATURATION: 26
IRON: 85

## 2023-12-05 ENCOUNTER — OFFICE VISIT (OUTPATIENT)
Dept: URBAN - METROPOLITAN AREA CLINIC 105 | Facility: CLINIC | Age: 58
End: 2023-12-05
Payer: COMMERCIAL

## 2023-12-05 ENCOUNTER — DASHBOARD ENCOUNTERS (OUTPATIENT)
Age: 58
End: 2023-12-05

## 2023-12-05 VITALS
BODY MASS INDEX: 34.83 KG/M2 | TEMPERATURE: 97.1 F | DIASTOLIC BLOOD PRESSURE: 90 MMHG | HEART RATE: 96 BPM | SYSTOLIC BLOOD PRESSURE: 152 MMHG | HEIGHT: 70 IN | WEIGHT: 243.3 LBS

## 2023-12-05 DIAGNOSIS — E55.9 VITAMIN D DEFICIENCY: ICD-10-CM

## 2023-12-05 DIAGNOSIS — D50.9 IRON DEFICIENCY ANEMIA: ICD-10-CM

## 2023-12-05 DIAGNOSIS — E53.8 VITAMIN B12 DEFICIENCY: ICD-10-CM

## 2023-12-05 PROCEDURE — 99214 OFFICE O/P EST MOD 30 MIN: CPT | Performed by: INTERNAL MEDICINE

## 2023-12-05 RX ORDER — ATORVASTATIN CALCIUM 10 MG/1
1 TABLET TABLET, FILM COATED ORAL ONCE A DAY
Status: ACTIVE | COMMUNITY

## 2023-12-05 RX ORDER — ATORVASTATIN CALCIUM 20 MG/1
1 TABLET TABLET, FILM COATED ORAL ONCE A DAY
Status: ACTIVE | COMMUNITY

## 2023-12-05 RX ORDER — OMEPRAZOLE 40 MG/1
1 CAPSULE 30 MINUTES BEFORE MORNING MEAL CAPSULE, DELAYED RELEASE ORAL ONCE A DAY
Qty: 90 | Refills: 1 | Status: ACTIVE | COMMUNITY

## 2023-12-05 RX ORDER — FERROUS GLUCONATE 324 MG
1 TABLET WITH WATER OR JUICE BETWEEN MEALS TABLET ORAL ONCE A DAY
Qty: 90 TABLET | Refills: 0 | Status: ACTIVE | COMMUNITY
Start: 2020-07-24

## 2023-12-05 RX ORDER — AMLODIPINE BESYLATE 10 MG/1
TAKE ONE-HALF TABLET BY MOUTH ONCE DAILY TABLET ORAL
Qty: 15 EACH | Refills: 1 | Status: ACTIVE | COMMUNITY

## 2023-12-05 RX ORDER — CYANOCOBALAMIN (VITAMIN B-12) 500 MCG
1 TABLET TABLET ORAL ONCE A DAY
Status: ACTIVE | COMMUNITY

## 2023-12-05 NOTE — HPI-OTHER HISTORIES
The patient presents on hospital follow-up for anemia due to a GI bleed.  PAST MEDICAL HISTORY: On 7/22/20, she said she went to the ER ([facility]) last Monday due to dehydration after an acute episode of nausea/vomiting. She was kept overnight and given fluids and blood. She said she was given 2 pints of blood. She denied hematemesis. She reported darker stools with the acute episode of nausea/vomiting. She was put on omeprazole 40 mg BID for 14 days. Nausea/vomiting and abdominal pain resolved. She denied a history of vitamin B12 deficiency. She was not on oral iron. There had been no prior surgeries.  On 10/27/20, she said she didn't take omeprazole 40 mg BID with abx treatment for H. pylori. She said she was told to hold on omeprazole during that time - MA clearly documented instructed on taking PPI BID and with BID script sent to pharmacy. She was currently on omeprazole 40 mg QD. She was now on iron 1 pill QD. She noted increased energy since starting on iron. Nausea/vomiting has resolved. Rectal bleeding was associated with constipation. She took a stool softener PRN.  On 1/5/21, she said she was off iron. She continued on vitamin B12/D/C and omeprazole 40 mg QAM. There hadn't been a recurrence of nausea/vomiting she stated. She felt energized.  On 4/6/21, she said she tapered off omeprazole in March. She continued on vit D/B12/C.  She denied any nausea/vomiting.  On 10/6/21, she said she did not get around to getting her labs done with [facility]. She had her annual physical at the beginning of the Summer. She had not had a recurrence of nausea/vomiting. She took omeprazole PRN.  On 4/6/22, she said she was asymptomatic. She continued on vit D & B12 supplementation. She was off iron.  HPI: Today, she says she has been on iron since the end of Oct. She is still on B12 and vit D supplementation.  Labs 11/9/23 - Iron 85, iron sat 36, TIBC 321, ferritin 139.4/6/22 - Ferritin 177. Iron 73, iron sat 22%, TIBC 327, UIBC 254. CBC, vit B12, vit D all normal. 10/27/23 - CBC normal except hgb 11.3. CMP, A1c all normal. 1/26/23 - CBC, CMP all normal. 10/7/21 - Ferritin 228, TIBC 356, UIBC 272, iron 84, iron sat 24%. H. pylori breath test negative. CBC, vit B12, vit D all normal. 1/5/21 - CBC, iron/TIBC, ferritin, vit B12 all normal. 10/27/20 - H. pylori breath test negative. CBC, CMP, iron/TIBC, ferritin all normal. 7/22/20 - CBC normal except hgb 7.7, platelets 488. Iron sat 9%. Vit B12/folate, ferritin, hgb frac all normal. 7/15/20 - CMP normal except potassium 3.3. 7/14/20 - CBC normal except WBC 12.7, hgb 9.7. BMP normal. 7/13/20 - CBC normal except WBC 11.5, hgb 7.7. BMP normal except BUN 31, alk phos 30, GFR 57, creatinine 1.23. Iron/TIBC normal. 7/12/20 - Tot chol 282, triglyceride 162, non-, , VLDL 32. Hgb A1c normal.

## 2023-12-06 PROBLEM — 87522002 IRON DEFICIENCY ANEMIA: Status: ACTIVE | Noted: 2020-10-27

## 2023-12-06 LAB
ABSOLUTE BASOPHILS: 29
ABSOLUTE EOSINOPHILS: 259
ABSOLUTE LYMPHOCYTES: 2635
ABSOLUTE MONOCYTES: 322
ABSOLUTE NEUTROPHILS: 1555
BASOPHILS: 0.6
EOSINOPHILS: 5.4
HEMATOCRIT: 36
HEMOGLOBIN: 11.9
LYMPHOCYTES: 54.9
MCH: 28.4
MCHC: 33.1
MCV: 85.9
MONOCYTES: 6.7
MPV: 10.2
NEUTROPHILS: 32.4
PLATELET COUNT: 357
RDW: 12.1
RED BLOOD CELL COUNT: 4.19
VITAMIN B12: 1045
VITAMIN D,25-OH,TOTAL,IA: 45
WHITE BLOOD CELL COUNT: 4.8

## 2024-01-31 ENCOUNTER — TELEPHONE ENCOUNTER (OUTPATIENT)
Dept: URBAN - METROPOLITAN AREA CLINIC 105 | Facility: CLINIC | Age: 59
End: 2024-01-31

## 2024-01-31 RX ORDER — OMEPRAZOLE 40 MG/1
1 CAPSULE 30 MINUTES BEFORE MORNING MEAL CAPSULE, DELAYED RELEASE ORAL ONCE A DAY
Qty: 90 | Refills: 3 | OUTPATIENT

## 2024-06-05 ENCOUNTER — OFFICE VISIT (OUTPATIENT)
Dept: URBAN - METROPOLITAN AREA CLINIC 105 | Facility: CLINIC | Age: 59
End: 2024-06-05

## 2024-06-05 RX ORDER — AMLODIPINE BESYLATE 10 MG/1
TAKE ONE-HALF TABLET BY MOUTH ONCE DAILY TABLET ORAL
Qty: 15 EACH | Refills: 1 | COMMUNITY

## 2024-06-05 RX ORDER — OMEPRAZOLE 40 MG/1
1 CAPSULE 30 MINUTES BEFORE MORNING MEAL CAPSULE, DELAYED RELEASE ORAL ONCE A DAY
Qty: 90 | Refills: 3 | COMMUNITY

## 2024-06-05 RX ORDER — FERROUS GLUCONATE 324 MG
1 TABLET WITH WATER OR JUICE BETWEEN MEALS TABLET ORAL ONCE A DAY
Qty: 90 TABLET | Refills: 0 | COMMUNITY
Start: 2020-07-24

## 2024-06-05 RX ORDER — ATORVASTATIN CALCIUM 10 MG/1
1 TABLET TABLET, FILM COATED ORAL ONCE A DAY
COMMUNITY

## 2024-06-05 RX ORDER — ATORVASTATIN CALCIUM 20 MG/1
1 TABLET TABLET, FILM COATED ORAL ONCE A DAY
COMMUNITY

## 2024-06-05 RX ORDER — CYANOCOBALAMIN (VITAMIN B-12) 500 MCG
1 TABLET TABLET ORAL ONCE A DAY
COMMUNITY

## 2024-06-13 ENCOUNTER — OFFICE VISIT (OUTPATIENT)
Dept: URBAN - METROPOLITAN AREA CLINIC 105 | Facility: CLINIC | Age: 59
End: 2024-06-13
Payer: COMMERCIAL

## 2024-06-13 VITALS
HEART RATE: 96 BPM | DIASTOLIC BLOOD PRESSURE: 82 MMHG | HEIGHT: 70 IN | WEIGHT: 241.4 LBS | TEMPERATURE: 97.3 F | SYSTOLIC BLOOD PRESSURE: 145 MMHG | BODY MASS INDEX: 34.56 KG/M2

## 2024-06-13 DIAGNOSIS — R11.2 NAUSEA & VOMITING: ICD-10-CM

## 2024-06-13 DIAGNOSIS — A04.8 OTHER SPECIFIED BACTERIAL INTESTINAL INFECTIONS: ICD-10-CM

## 2024-06-13 DIAGNOSIS — D50.8 ACHLORHYDRIC ANEMIA: ICD-10-CM

## 2024-06-13 DIAGNOSIS — K21.9 GASTROESOPHAGEAL REFLUX DISEASE WITHOUT ESOPHAGITIS: ICD-10-CM

## 2024-06-13 PROBLEM — 266435005: Status: ACTIVE | Noted: 2024-06-13

## 2024-06-13 PROCEDURE — 99214 OFFICE O/P EST MOD 30 MIN: CPT | Performed by: INTERNAL MEDICINE

## 2024-06-13 RX ORDER — ATORVASTATIN CALCIUM 20 MG/1
1 TABLET TABLET, FILM COATED ORAL ONCE A DAY
Status: ACTIVE | COMMUNITY

## 2024-06-13 RX ORDER — AMLODIPINE BESYLATE 10 MG/1
TAKE ONE-HALF TABLET BY MOUTH ONCE DAILY TABLET ORAL
Qty: 15 EACH | Refills: 1 | Status: ACTIVE | COMMUNITY

## 2024-06-13 RX ORDER — OMEPRAZOLE 20 MG/1
1 CAPSULE 30 MINUTES BEFORE MORNING MEAL CAPSULE, DELAYED RELEASE ORAL ONCE A DAY
Qty: 90 | Refills: 1 | OUTPATIENT

## 2024-06-13 RX ORDER — OMEPRAZOLE 40 MG/1
1 CAPSULE 30 MINUTES BEFORE MORNING MEAL CAPSULE, DELAYED RELEASE ORAL ONCE A DAY
Qty: 90 | Refills: 3 | Status: ACTIVE | COMMUNITY

## 2024-06-13 RX ORDER — FERROUS GLUCONATE 324 MG
1 TABLET WITH WATER OR JUICE BETWEEN MEALS TABLET ORAL ONCE A DAY
Qty: 90 TABLET | Refills: 0 | Status: ON HOLD | COMMUNITY
Start: 2020-07-24

## 2024-06-13 RX ORDER — ATORVASTATIN CALCIUM 10 MG/1
1 TABLET TABLET, FILM COATED ORAL ONCE A DAY
Status: ACTIVE | COMMUNITY

## 2024-06-13 RX ORDER — CYANOCOBALAMIN (VITAMIN B-12) 500 MCG
1 TABLET TABLET ORAL ONCE A DAY
Status: ACTIVE | COMMUNITY

## 2024-06-13 NOTE — HPI-OTHER HISTORIES
The patient presents on hospital follow-up for anemia due to a GI bleed.  PAST MEDICAL HISTORY: On 7/22/20, she said she went to the ER ([facility]) last Monday due to dehydration after an acute episode of nausea/vomiting. She was kept overnight and given fluids and blood. She said she was given 2 pints of blood. She denied hematemesis. She reported darker stools with the acute episode of nausea/vomiting. She was put on omeprazole 40 mg BID for 14 days. Nausea/vomiting and abdominal pain resolved. She denied a history of vitamin B12 deficiency. She was not on oral iron. There had been no prior surgeries.  On 10/27/20, she said she didn't take omeprazole 40 mg BID with abx treatment for H. pylori. She said she was told to hold on omeprazole during that time - MA clearly documented instructed on taking PPI BID and with BID script sent to pharmacy. She was currently on omeprazole 40 mg QD. She was now on iron 1 pill QD. She noted increased energy since starting on iron. Nausea/vomiting has resolved. Rectal bleeding was associated with constipation. She took a stool softener PRN.  On 1/5/21, she said she was off iron. She continued on vitamin B12/D/C and omeprazole 40 mg QAM. There hadn't been a recurrence of nausea/vomiting she stated. She felt energized.  On 4/6/21, she said she tapered off omeprazole in March. She continued on vit D/B12/C.  She denied any nausea/vomiting.  On 10/6/21, she said she did not get around to getting her labs done with [facility]. She had her annual physical at the beginning of the Summer. She had not had a recurrence of nausea/vomiting. She took omeprazole PRN.  On 4/6/22, she said she was asymptomatic. She continued on vit D & B12 supplementation. She was off iron.  On 12/5/23, she said she had been on iron since the end of Oct. She was still on B12 and vit D supplementation.  HPI: Today, she reports no nausea/vomiting or rectal bleeding. She is not on iron. A recent episode of reflux resolved once she resumed omeprazole. She has been back on omeprazole for about a month. She is on B12 and D supplementation.  Labs 12/5/23 - CBC, vit D, vit B12 all normal. 11/9/23 - Iron 85, iron sat 36, TIBC 321, ferritin 139. 4/6/22 - Ferritin 177. Iron 73, iron sat 22%, TIBC 327, UIBC 254. CBC, vit B12, vit D all normal. 10/27/23 - CBC normal except hgb 11.3. CMP, A1c all normal. 1/26/23 - CBC, CMP all normal. 10/7/21 - Ferritin 228, TIBC 356, UIBC 272, iron 84, iron sat 24%. H. pylori breath test negative. CBC, vit B12, vit D all normal. 1/5/21 - CBC, iron/TIBC, ferritin, vit B12 all normal. 10/27/20 - H. pylori breath test negative. CBC, CMP, iron/TIBC, ferritin all normal. 7/22/20 - CBC normal except hgb 7.7, platelets 488. Iron sat 9%. Vit B12/folate, ferritin, hgb frac all normal. 7/15/20 - CMP normal except potassium 3.3. 7/14/20 - CBC normal except WBC 12.7, hgb 9.7. BMP normal. 7/13/20 - CBC normal except WBC 11.5, hgb 7.7. BMP normal except BUN 31, alk phos 30, GFR 57, creatinine 1.23. Iron/TIBC normal. 7/12/20 - Tot chol 282, triglyceride 162, non-, , VLDL 32. Hgb A1c normal.

## 2024-12-11 ENCOUNTER — OFFICE VISIT (OUTPATIENT)
Dept: URBAN - METROPOLITAN AREA CLINIC 105 | Facility: CLINIC | Age: 59
End: 2024-12-11

## 2025-01-15 ENCOUNTER — OFFICE VISIT (OUTPATIENT)
Dept: URBAN - METROPOLITAN AREA CLINIC 105 | Facility: CLINIC | Age: 60
End: 2025-01-15
Payer: COMMERCIAL

## 2025-01-15 VITALS
BODY MASS INDEX: 35.42 KG/M2 | SYSTOLIC BLOOD PRESSURE: 150 MMHG | WEIGHT: 247.4 LBS | HEIGHT: 70 IN | TEMPERATURE: 97.1 F | DIASTOLIC BLOOD PRESSURE: 97 MMHG | HEART RATE: 118 BPM

## 2025-01-15 DIAGNOSIS — K21.9 GASTROESOPHAGEAL REFLUX DISEASE WITHOUT ESOPHAGITIS: ICD-10-CM

## 2025-01-15 DIAGNOSIS — D50.9 IRON DEFICIENCY ANEMIA: ICD-10-CM

## 2025-01-15 DIAGNOSIS — E55.9 VITAMIN D DEFICIENCY: ICD-10-CM

## 2025-01-15 DIAGNOSIS — F64.0 TRANSGENDER: ICD-10-CM

## 2025-01-15 DIAGNOSIS — A04.8 HELICOBACTER PYLORI (H. PYLORI): ICD-10-CM

## 2025-01-15 DIAGNOSIS — Z12.11 SCREEN FOR COLON CANCER: ICD-10-CM

## 2025-01-15 DIAGNOSIS — R11.2 NAUSEA & VOMITING: ICD-10-CM

## 2025-01-15 DIAGNOSIS — K64.8 INTERNAL HEMORRHOID, BLEEDING: ICD-10-CM

## 2025-01-15 DIAGNOSIS — E53.8 VITAMIN B12 DEFICIENCY: ICD-10-CM

## 2025-01-15 PROCEDURE — 99214 OFFICE O/P EST MOD 30 MIN: CPT | Performed by: INTERNAL MEDICINE

## 2025-01-15 RX ORDER — FERROUS GLUCONATE 324 MG
1 TABLET WITH WATER OR JUICE BETWEEN MEALS TABLET ORAL ONCE A DAY
Qty: 90 TABLET | Refills: 0 | Status: ON HOLD | COMMUNITY
Start: 2020-07-24

## 2025-01-15 RX ORDER — ATORVASTATIN CALCIUM 20 MG/1
1 TABLET TABLET, FILM COATED ORAL ONCE A DAY
Status: ACTIVE | COMMUNITY

## 2025-01-15 RX ORDER — OMEPRAZOLE 20 MG/1
1 CAPSULE 30 MINUTES BEFORE MORNING MEAL CAPSULE, DELAYED RELEASE ORAL ONCE A DAY
Qty: 90 | Refills: 1 | Status: ACTIVE | COMMUNITY

## 2025-01-15 RX ORDER — ATORVASTATIN CALCIUM 10 MG/1
1 TABLET TABLET, FILM COATED ORAL ONCE A DAY
Status: ACTIVE | COMMUNITY

## 2025-01-15 RX ORDER — AMLODIPINE BESYLATE 10 MG/1
TAKE ONE-HALF TABLET BY MOUTH ONCE DAILY TABLET ORAL
Qty: 15 EACH | Refills: 1 | Status: ACTIVE | COMMUNITY

## 2025-01-15 RX ORDER — CYANOCOBALAMIN (VITAMIN B-12) 500 MCG
1 TABLET TABLET ORAL ONCE A DAY
Status: ACTIVE | COMMUNITY

## 2025-01-15 RX ORDER — OMEPRAZOLE 10 MG/1
1 CAPSULE 30 MINUTES BEFORE MORNING MEAL CAPSULE, DELAYED RELEASE ORAL ONCE A DAY
Qty: 90 | Refills: 3 | OUTPATIENT

## 2025-01-15 NOTE — HPI-OTHER HISTORIES
The patient presents on hospital follow-up for anemia due to a GI bleed.  PAST MEDICAL HISTORY: On 7/22/20, she said she went to the ER ([facility]) last Monday due to dehydration after an acute episode of nausea/vomiting. She was kept overnight and given fluids and blood. She said she was given 2 pints of blood. She denied hematemesis. She reported darker stools with the acute episode of nausea/vomiting. She was put on omeprazole 40 mg BID for 14 days. Nausea/vomiting and abdominal pain resolved. She denied a history of vitamin B12 deficiency. She was not on oral iron. There had been no prior surgeries.  On 10/27/20, she said she didn't take omeprazole 40 mg BID with abx treatment for H. pylori. She said she was told to hold on omeprazole during that time - MA clearly documented instructed on taking PPI BID and with BID script sent to pharmacy. She was currently on omeprazole 40 mg QD. She was now on iron 1 pill QD. She noted increased energy since starting on iron. Nausea/vomiting has resolved. Rectal bleeding was associated with constipation. She took a stool softener PRN.  On 1/5/21, she said she was off iron. She continued on vitamin B12/D/C and omeprazole 40 mg QAM. There hadn't been a recurrence of nausea/vomiting she stated. She felt energized.  On 4/6/21, she said she tapered off omeprazole in March. She continued on vit D/B12/C.  She denied any nausea/vomiting.  On 10/6/21, she said she did not get around to getting her labs done with [facility]. She had her annual physical at the beginning of the Summer. She had not had a recurrence of nausea/vomiting. She took omeprazole PRN.  On 4/6/22, she said she was asymptomatic. She continued on vit D & B12 supplementation. She was off iron.  On 12/5/23, she said she had been on iron since the end of Oct. She was still on B12 and vit D supplementation.  On 6/13/24, she reported no nausea/vomiting or rectal bleeding. She was not on iron. A recent episode of reflux resolved once she resumed omeprazole. She had been back on omeprazole for about a month. She was on B12 and D supplementation.  HPI Today, she says she has decreased omeprazole to 20 mg QD and tolerates it. No nausea/vomiting or reflux symptoms. She has stopped drinking tea and soda. She is off iron. Labs done with PCP in nov - hgb and iron normal. She continues on B12 and D supplementation.  Labs 12/5/23 - CBC, vit D, vit B12 all normal. 11/9/23 - Iron 85, iron sat 36, TIBC 321, ferritin 139. 4/6/22 - Ferritin 177. Iron 73, iron sat 22%, TIBC 327, UIBC 254. CBC, vit B12, vit D all normal. 10/27/23 - CBC normal except hgb 11.3. CMP, A1c all normal. 1/26/23 - CBC, CMP all normal. 10/7/21 - Ferritin 228, TIBC 356, UIBC 272, iron 84, iron sat 24%. H. pylori breath test negative. CBC, vit B12, vit D all normal. 1/5/21 - CBC, iron/TIBC, ferritin, vit B12 all normal. 10/27/20 - H. pylori breath test negative. CBC, CMP, iron/TIBC, ferritin all normal. 7/22/20 - CBC normal except hgb 7.7, platelets 488. Iron sat 9%. Vit B12/folate, ferritin, hgb frac all normal. 7/15/20 - CMP normal except potassium 3.3. 7/14/20 - CBC normal except WBC 12.7, hgb 9.7. BMP normal. 7/13/20 - CBC normal except WBC 11.5, hgb 7.7. BMP normal except BUN 31, alk phos 30, GFR 57, creatinine 1.23. Iron/TIBC normal. 7/12/20 - Tot chol 282, triglyceride 162, non-, , VLDL 32. Hgb A1c normal.